# Patient Record
Sex: FEMALE | Race: WHITE | Employment: OTHER | URBAN - METROPOLITAN AREA
[De-identification: names, ages, dates, MRNs, and addresses within clinical notes are randomized per-mention and may not be internally consistent; named-entity substitution may affect disease eponyms.]

---

## 2017-01-16 ENCOUNTER — HOSPITAL ENCOUNTER (EMERGENCY)
Facility: HOSPITAL | Age: 82
Discharge: NURSING, REHAB OR LONG-TERM CARE | End: 2017-01-16
Attending: EMERGENCY MEDICINE
Payer: MEDICARE

## 2017-01-16 ENCOUNTER — APPOINTMENT (OUTPATIENT)
Dept: GENERAL RADIOLOGY | Facility: HOSPITAL | Age: 82
End: 2017-01-16
Attending: EMERGENCY MEDICINE
Payer: MEDICARE

## 2017-01-16 VITALS
RESPIRATION RATE: 18 BRPM | HEART RATE: 86 BPM | OXYGEN SATURATION: 91 % | TEMPERATURE: 99 F | DIASTOLIC BLOOD PRESSURE: 59 MMHG | SYSTOLIC BLOOD PRESSURE: 140 MMHG

## 2017-01-16 DIAGNOSIS — S82.201A TIBIA/FIBULA FRACTURE, SHAFT, RIGHT, CLOSED, INITIAL ENCOUNTER: Primary | ICD-10-CM

## 2017-01-16 DIAGNOSIS — S82.401A TIBIA/FIBULA FRACTURE, SHAFT, RIGHT, CLOSED, INITIAL ENCOUNTER: Primary | ICD-10-CM

## 2017-01-16 PROCEDURE — 73590 X-RAY EXAM OF LOWER LEG: CPT

## 2017-01-16 PROCEDURE — 29505 APPLICATION LONG LEG SPLINT: CPT

## 2017-01-16 PROCEDURE — 99284 EMERGENCY DEPT VISIT MOD MDM: CPT

## 2017-01-16 PROCEDURE — 73560 X-RAY EXAM OF KNEE 1 OR 2: CPT

## 2017-01-16 PROCEDURE — 96376 TX/PRO/DX INJ SAME DRUG ADON: CPT

## 2017-01-16 PROCEDURE — 96374 THER/PROPH/DIAG INJ IV PUSH: CPT

## 2017-01-16 RX ORDER — HYDROMORPHONE HYDROCHLORIDE 1 MG/ML
0.5 INJECTION, SOLUTION INTRAMUSCULAR; INTRAVENOUS; SUBCUTANEOUS ONCE
Status: COMPLETED | OUTPATIENT
Start: 2017-01-16 | End: 2017-01-16

## 2017-01-16 RX ORDER — RISPERIDONE 0.25 MG/1
0.25 TABLET, FILM COATED ORAL DAILY
COMMUNITY

## 2017-01-16 RX ORDER — LORAZEPAM 2 MG/ML
1 CONCENTRATE ORAL EVERY 8 HOURS PRN
COMMUNITY
End: 2017-05-02

## 2017-01-16 RX ORDER — HYDROMORPHONE HYDROCHLORIDE 1 MG/ML
1 INJECTION, SOLUTION INTRAMUSCULAR; INTRAVENOUS; SUBCUTANEOUS ONCE
Status: DISCONTINUED | OUTPATIENT
Start: 2017-01-16 | End: 2017-01-16

## 2017-01-16 RX ORDER — HYDROCODONE BITARTRATE AND ACETAMINOPHEN 5; 325 MG/1; MG/1
1-2 TABLET ORAL EVERY 4 HOURS PRN
Qty: 20 TABLET | Refills: 0 | Status: SHIPPED | OUTPATIENT
Start: 2017-01-16 | End: 2017-01-23

## 2017-01-16 RX ORDER — HYDROMORPHONE HYDROCHLORIDE 1 MG/ML
0.5 INJECTION, SOLUTION INTRAMUSCULAR; INTRAVENOUS; SUBCUTANEOUS ONCE
Status: DISCONTINUED | OUTPATIENT
Start: 2017-01-16 | End: 2017-01-16

## 2017-01-16 RX ORDER — OMEPRAZOLE 20 MG/1
20 CAPSULE, DELAYED RELEASE ORAL
COMMUNITY
End: 2017-05-02

## 2017-01-16 NOTE — ED INITIAL ASSESSMENT (HPI)
Pt presents via ambulance for eval of r lower ext injury according to ems pt noted with bruise to r lower leeg were the nursing home thinks she may have hurt on side rail of bed

## 2017-01-16 NOTE — ED PROVIDER NOTES
Patient Seen in: [facility] Emergency Department    History   Patient presents with:  Lower Extremity Injury (musculoskeletal)    Stated Complaint: lower ext injury     HPI    61-year-old female complaining of right leg pain.   The patient was found Never Smoker                      Alcohol Use: No                Review of Systems    Positive for stated complaint: lower ext injury   Other systems are as noted in HPI. Constitutional and vital signs reviewed.       All other systems reviewed and negativ fracture which is mildly displaced and angulated the patient was placed in a long-leg splint the splint position check CMS intact orthopedic surgery was consulted and saw the patient in the ER discussed the case with the patient's son the patient has demen

## 2017-01-16 NOTE — ED NOTES
I called and spoke with Bernardino SEWELL from [facility]. She is aware the patient will be returning to them with prescription for pain control and follow up with Ortho in 2 weeks. Son aware and agrees with plan as well.

## 2017-01-16 NOTE — ED NOTES
Patient's night nurse called looking for an update, able to verify patient's birthdate. Patient's nurse will be calling and updating the son on results.

## 2017-01-16 NOTE — CONSULTS
[facility]  Report of Consultation    Jayson Villanueva Patient Status:  Emergency    1921 MRN [id number]   Location [address] [facility] Attending Sage Hicks MD   Hosp Day # 0 PCP Robin Colindres MD     Reason for Consultation: bed.  No apparent distress. Extremities:  Right leg is already in long leg splint. Right leg compartments are soft. All toes are viable with pink color. I did not get any reaction from palpation of both wrists.   No reaction from log rolling both hips

## 2017-01-20 PROBLEM — S82.201A CLOSED FRACTURE OF RIGHT TIBIA: Status: ACTIVE | Noted: 2017-01-20

## 2017-01-23 ENCOUNTER — TELEPHONE (OUTPATIENT)
Dept: FAMILY MEDICINE CLINIC | Facility: CLINIC | Age: 82
End: 2017-01-23

## 2017-01-23 NOTE — TELEPHONE ENCOUNTER
Spoke with patients son who stated that a Dexa scan was ordered by Nurse practitioner and he is wondering if this is necessary at this time, patient has an appointment with Ortho next week, he would like a telephone call From  to discuss what patients op

## 2017-05-02 ENCOUNTER — SNF VISIT (OUTPATIENT)
Dept: INTERNAL MEDICINE CLINIC | Age: 82
End: 2017-05-02

## 2017-05-02 VITALS
RESPIRATION RATE: 18 BRPM | OXYGEN SATURATION: 95 % | SYSTOLIC BLOOD PRESSURE: 137 MMHG | DIASTOLIC BLOOD PRESSURE: 67 MMHG | TEMPERATURE: 98 F | HEART RATE: 79 BPM

## 2017-05-02 DIAGNOSIS — S82.201S TIBIA/FIBULA FRACTURE, RIGHT, SEQUELA: Primary | ICD-10-CM

## 2017-05-02 DIAGNOSIS — F99 PSYCHIATRIC DISORDER: ICD-10-CM

## 2017-05-02 DIAGNOSIS — R45.1 AGITATION: ICD-10-CM

## 2017-05-02 DIAGNOSIS — S82.401S TIBIA/FIBULA FRACTURE, RIGHT, SEQUELA: Primary | ICD-10-CM

## 2017-05-02 DIAGNOSIS — S82.254D CLOSED NONDISPLACED COMMINUTED FRACTURE OF SHAFT OF RIGHT TIBIA WITH ROUTINE HEALING, SUBSEQUENT ENCOUNTER: ICD-10-CM

## 2017-05-02 DIAGNOSIS — F03.91 DEMENTIA WITH BEHAVIORAL DISTURBANCE, UNSPECIFIED DEMENTIA TYPE (HCC): ICD-10-CM

## 2017-05-02 PROCEDURE — 99309 SBSQ NF CARE MODERATE MDM 30: CPT | Performed by: NURSE PRACTITIONER

## 2017-05-02 RX ORDER — HYDROCODONE BITARTRATE AND ACETAMINOPHEN 5; 325 MG/1; MG/1
1 TABLET ORAL EVERY 4 HOURS PRN
COMMUNITY

## 2017-05-02 RX ORDER — ENOXAPARIN SODIUM 100 MG/ML
40 INJECTION SUBCUTANEOUS DAILY
COMMUNITY
Start: 2017-04-24 | End: 2017-05-08

## 2017-05-02 RX ORDER — METHIMAZOLE 5 MG/1
5 TABLET ORAL DAILY
COMMUNITY

## 2017-05-02 RX ORDER — B-COMPLEX WITH VITAMIN C
1 TABLET ORAL 2 TIMES DAILY
COMMUNITY

## 2017-05-02 RX ORDER — MELATONIN
325
COMMUNITY

## 2017-05-02 RX ORDER — LORAZEPAM 0.5 MG/1
0.5 TABLET ORAL EVERY 6 HOURS PRN
COMMUNITY

## 2017-05-02 RX ORDER — ACETAMINOPHEN 325 MG/1
TABLET ORAL EVERY 6 HOURS PRN
COMMUNITY

## 2017-05-02 NOTE — PROGRESS NOTES
Arvin Joyce  : 1921  Age 80year old  female patient is admitted to Facility: [facility] for right tib/fib fracture repair per [address]     Discharge date to [facility]:  17  ELOS: 16-19 days   Anticipated disc Heart Disorder Neg    • Hypertension Neg    • Lipids Neg    • Obesity Neg    • Psychiatric Neg        Smoking Status: Never Smoker                      Alcohol Use: No                ALLERGIES:    Penicillin V Potass*    Rash  Penicillins                 C chest pain, no palpitations   GI: denies nausea, vomiting, constipation, diarrhea; no rectal bleeding; no heartburn  :no dysuria, urgency or frequency;   MUSCULOSKELETAL:no joint complaints upper or lower extremities  + s/p tib/fib repair   NEURO:no sens agitation    No verbal attempts made today. Patient has dementia. She is being fed by restorative therapy. Dressing to right lower extremity with ace/ kerlix to the ankle/cdi/ no drainage. She is NWB to RLE.  Pt has norco prn or acetaminophen prn for pain m Weakness/deconditioning  1. PT/OT/ST   2. Anticipate discharge from subacute to LTC on 5/11/17  3. F/U with Dr Elita Fothergill in 2 weeks. Make appt if not already made.          FELECIA Alanis  05/02/2017   9:39 AM

## 2017-05-04 ENCOUNTER — SNF VISIT (OUTPATIENT)
Dept: INTERNAL MEDICINE CLINIC | Age: 82
End: 2017-05-04

## 2017-05-04 VITALS
HEART RATE: 80 BPM | RESPIRATION RATE: 20 BRPM | DIASTOLIC BLOOD PRESSURE: 59 MMHG | SYSTOLIC BLOOD PRESSURE: 105 MMHG | TEMPERATURE: 97 F | OXYGEN SATURATION: 95 %

## 2017-05-04 DIAGNOSIS — S82.401S TIBIA/FIBULA FRACTURE, RIGHT, SEQUELA: Primary | ICD-10-CM

## 2017-05-04 DIAGNOSIS — S82.201S TIBIA/FIBULA FRACTURE, RIGHT, SEQUELA: Primary | ICD-10-CM

## 2017-05-04 PROCEDURE — 99308 SBSQ NF CARE LOW MDM 20: CPT | Performed by: NURSE PRACTITIONER

## 2017-05-04 NOTE — PROGRESS NOTES
Christal Suarez, 12/20/1921, 80year old, female    Chief Complaint:  Patient presents with:   Follow - Up: weakness, but I am okay   Discharge date to [facility] 4/24/17  ELOS: 16-19 days    Anticipated discharge date:  5/11/17        Subjective:    [facility] hosp lymphedema  MUSCULOSKELETAL: no acute synovitis upper or lower extremity  Right LE tibia site c/d/i: ace wrap intact/right foot pink/warm 2+ pedal pulse  Able to squeeze hands bilaterally   EXTREMITIES/VASCULAR:no cyanosis, clubbing or edema  Radial and DP

## 2017-07-14 ENCOUNTER — LAB REQUISITION (OUTPATIENT)
Dept: LAB | Facility: HOSPITAL | Age: 82
End: 2017-07-14
Attending: FAMILY MEDICINE
Payer: MEDICARE

## 2017-07-14 DIAGNOSIS — R53.83 OTHER FATIGUE: ICD-10-CM

## 2017-07-14 LAB
ALBUMIN SERPL-MCNC: 3 G/DL (ref 3.5–4.8)
ALP LIVER SERPL-CCNC: 232 U/L (ref 55–142)
ALT SERPL-CCNC: 76 U/L (ref 14–54)
AST SERPL-CCNC: 66 U/L (ref 15–41)
BASOPHILS # BLD AUTO: 0.01 X10(3) UL (ref 0–0.1)
BASOPHILS NFR BLD AUTO: 0.1 %
BILIRUB SERPL-MCNC: 0.7 MG/DL (ref 0.1–2)
BUN BLD-MCNC: 22 MG/DL (ref 8–20)
CALCIUM BLD-MCNC: 8.6 MG/DL (ref 8.3–10.3)
CHLORIDE: 103 MMOL/L (ref 101–111)
CO2: 28 MMOL/L (ref 22–32)
CREAT BLD-MCNC: 0.67 MG/DL (ref 0.55–1.02)
EOSINOPHIL # BLD AUTO: 0.19 X10(3) UL (ref 0–0.3)
EOSINOPHIL NFR BLD AUTO: 2.4 %
ERYTHROCYTE [DISTWIDTH] IN BLOOD BY AUTOMATED COUNT: 15.3 % (ref 11.5–16)
GLUCOSE BLD-MCNC: 90 MG/DL (ref 70–99)
HCT VFR BLD AUTO: 33.8 % (ref 34–50)
HGB BLD-MCNC: 10.5 G/DL (ref 12–16)
IMMATURE GRANULOCYTE COUNT: 0.03 X10(3) UL (ref 0–1)
IMMATURE GRANULOCYTE RATIO %: 0.4 %
LYMPHOCYTES # BLD AUTO: 1.09 X10(3) UL (ref 0.9–4)
LYMPHOCYTES NFR BLD AUTO: 13.9 %
M PROTEIN MFR SERPL ELPH: 6.2 G/DL (ref 6.1–8.3)
MCH RBC QN AUTO: 28.3 PG (ref 27–33.2)
MCHC RBC AUTO-ENTMCNC: 31.1 G/DL (ref 31–37)
MCV RBC AUTO: 91.1 FL (ref 81–100)
MONOCYTES # BLD AUTO: 0.62 X10(3) UL (ref 0.1–0.6)
MONOCYTES NFR BLD AUTO: 7.9 %
NEUTROPHIL ABS PRELIM: 5.92 X10 (3) UL (ref 1.3–6.7)
NEUTROPHILS # BLD AUTO: 5.92 X10(3) UL (ref 1.3–6.7)
NEUTROPHILS NFR BLD AUTO: 75.3 %
PLATELET # BLD AUTO: 231 10(3)UL (ref 150–450)
POTASSIUM SERPL-SCNC: 4.4 MMOL/L (ref 3.6–5.1)
RBC # BLD AUTO: 3.71 X10(6)UL (ref 3.8–5.1)
RED CELL DISTRIBUTION WIDTH-SD: 51.1 FL (ref 35.1–46.3)
SODIUM SERPL-SCNC: 138 MMOL/L (ref 136–144)
WBC # BLD AUTO: 7.9 X10(3) UL (ref 4–13)

## 2017-07-14 PROCEDURE — 80053 COMPREHEN METABOLIC PANEL: CPT | Performed by: FAMILY MEDICINE

## 2017-07-14 PROCEDURE — 85025 COMPLETE CBC W/AUTO DIFF WBC: CPT | Performed by: FAMILY MEDICINE

## 2017-08-08 ENCOUNTER — MED REC SCAN ONLY (OUTPATIENT)
Dept: FAMILY MEDICINE CLINIC | Facility: CLINIC | Age: 82
End: 2017-08-08

## 2018-07-02 ENCOUNTER — MED REC SCAN ONLY (OUTPATIENT)
Dept: FAMILY MEDICINE CLINIC | Facility: CLINIC | Age: 83
End: 2018-07-02

## 2019-01-01 ENCOUNTER — TELEPHONE (OUTPATIENT)
Dept: FAMILY MEDICINE CLINIC | Facility: CLINIC | Age: 84
End: 2019-01-01

## 2019-01-01 ENCOUNTER — MED REC SCAN ONLY (OUTPATIENT)
Dept: FAMILY MEDICINE CLINIC | Facility: CLINIC | Age: 84
End: 2019-01-01

## 2019-03-25 NOTE — TELEPHONE ENCOUNTER
BUN going up, patient has poor p.o. intake  History of severe dementia  She was given boluses of fluids and that brought the numbers down  Patient does not drink water  Discussed with the son regarding advanced directives and comfort care  IV fluids can be

## (undated) NOTE — ED AVS SNAPSHOT
[facility] Emergency Department    [facility]  [address] [address]    Phone:  [phone]    Fax:  [phone]           Tessa Colt   MRN: [id number]    Department:  [facility] Emergency Department   Date of Visit:  1/16/2 IF THERE IS ANY CHANGE OR WORSENING OF YOUR CONDITION, CALL YOUR PRIMARY CARE PHYSICIAN AT ONCE OR RETURN IMMEDIATELY TO THE EMERGENCY DEPARTMENT.     If you have been prescribed any medication(s), please fill your prescription right away and begin taking t

## (undated) NOTE — ED AVS SNAPSHOT
[facility] Emergency Department    [place]  [address] [address]    Phone:  [phone]    Fax:  [phone]           Asa Reid   MRN: [id number]    Department:  [facility] Emergency Department   Date of Visit:  1/16/2 covered by your plan. Please contact your insurance company to determine coverage for follow-up care and referrals.     [address] [place] [phone]  Pediatric [phone] Emergency Department   [phone] to by a radiologist.  If there is a significant change in your reading, you will be contacted. Please make sure we have your correct phone number before you leave. After you leave, you should follow the attached instructions.      I have read and understand th [address].         Imaging Results         XR KNEE (1 OR 2 VIEWS), RIGHT (CPT=73560) (Final result) Result time:  01/16/17 07:26:07    Final result    Impression:    CONCLUSION:  Partial visualization of comminuted fractures of the proximal rig BONES:  There is marked osteopenia.  There is a comminuted fracture involving the proximal right tibia and fibula with some impaction and slight anterior displacement of the distal tibial fragment and slight medial displacement of the distal fibular   fragm